# Patient Record
Sex: MALE | Race: WHITE | NOT HISPANIC OR LATINO | Employment: FULL TIME | URBAN - METROPOLITAN AREA
[De-identification: names, ages, dates, MRNs, and addresses within clinical notes are randomized per-mention and may not be internally consistent; named-entity substitution may affect disease eponyms.]

---

## 2019-04-24 DIAGNOSIS — K21.9 GASTROESOPHAGEAL REFLUX DISEASE, ESOPHAGITIS PRESENCE NOT SPECIFIED: Primary | ICD-10-CM

## 2019-04-24 RX ORDER — PANTOPRAZOLE SODIUM 40 MG/1
40 TABLET, DELAYED RELEASE ORAL 2 TIMES DAILY
Refills: 1 | COMMUNITY
Start: 2019-03-25 | End: 2019-04-26 | Stop reason: SDUPTHER

## 2019-04-26 RX ORDER — PANTOPRAZOLE SODIUM 40 MG/1
40 TABLET, DELAYED RELEASE ORAL 2 TIMES DAILY
Qty: 180 TABLET | Refills: 3 | Status: SHIPPED | OUTPATIENT
Start: 2019-04-26 | End: 2020-05-19

## 2019-07-01 ENCOUNTER — TELEPHONE (OUTPATIENT)
Dept: GASTROENTEROLOGY | Facility: CLINIC | Age: 54
End: 2019-07-01

## 2019-07-01 NOTE — TELEPHONE ENCOUNTER
rcvd fax from nader Vizc[facility] [id number] for prior auth for pantoprazole 40 mg, twice a day    [facility] [id number]  [facility] [id number]  Group [id number]  ID [id number]    [facility]  Key:  [id number]

## 2019-07-08 NOTE — TELEPHONE ENCOUNTER
Please inform patient that twice a day will not go through and also he needs an appt to be seen Thank you

## 2019-07-08 NOTE — TELEPHONE ENCOUNTER
Called pt and advised  Pt said he has been taking the 2 pills a day since he had [place] but his insurance changed to [employer] [facility]     Pt stated he will call human resourses   Pt made appt for July 26 at 8am

## 2019-07-23 RX ORDER — SIMVASTATIN 20 MG
20 TABLET ORAL DAILY
Refills: 0 | COMMUNITY
Start: 2019-06-27

## 2019-07-23 RX ORDER — ALPRAZOLAM 0.5 MG/1
TABLET ORAL
Refills: 0 | COMMUNITY
Start: 2019-05-23

## 2019-07-26 ENCOUNTER — OFFICE VISIT (OUTPATIENT)
Dept: GASTROENTEROLOGY | Facility: CLINIC | Age: 54
End: 2019-07-26
Payer: COMMERCIAL

## 2019-07-26 VITALS
DIASTOLIC BLOOD PRESSURE: 80 MMHG | WEIGHT: 207.2 LBS | BODY MASS INDEX: 29.66 KG/M2 | HEIGHT: 70 IN | HEART RATE: 86 BPM | SYSTOLIC BLOOD PRESSURE: 120 MMHG

## 2019-07-26 DIAGNOSIS — K21.9 GASTROESOPHAGEAL REFLUX DISEASE, ESOPHAGITIS PRESENCE NOT SPECIFIED: Primary | ICD-10-CM

## 2019-07-26 DIAGNOSIS — R19.8 ANAL DISCHARGE: ICD-10-CM

## 2019-07-26 PROCEDURE — 99213 OFFICE O/P EST LOW 20 MIN: CPT | Performed by: PHYSICIAN ASSISTANT

## 2019-07-26 RX ORDER — GENTAMICIN SULFATE 3 MG/ML
SOLUTION/ DROPS OPHTHALMIC
COMMUNITY
End: 2022-04-25 | Stop reason: ALTCHOICE

## 2019-07-26 RX ORDER — DOXYCYCLINE HYCLATE 100 MG
100 TABLET ORAL EVERY 12 HOURS
Refills: 0 | COMMUNITY
Start: 2019-07-03 | End: 2019-09-27

## 2019-07-26 NOTE — PROGRESS NOTES
[facility] - Outpatient Consultation  Lexie Knott 53 y o  male MRN: [id number]  Encounter: [id number]          ASSESSMENT AND PLAN:      1  Gastroesophageal reflux disease, esophagitis presence not specified  His insurance changed and he had to drop his PPI down to once daily  He is seeming to do well - will continue once daily with zantac prn  Consider possibility of weaning off in the future  2  Anal discharge  Reports moisture after flatus which causes itching  He is on several fiber supplements - I asked him to stop his gummi fibers  Consider use of Questran    ______________________________________________________________________    HPI:  26-year-old male with GERD and irritable bowel syndrome presents for routine follow-up  Recently his insurance changed and they denied his pantoprazole twice daily  He ran out of the medication for approximately 1 week and his symptoms did flare  He is back on pantoprazole once daily and is seemed to do okay  He had 1 episode heartburn last night  He denies any dysphagia or hematemesis  He admits that he is taking large quantities of ibuprofen due to headache  He also reports that he is having episodes of anal seepage after flatness  This causes anal itching  He reports his bowel movements are otherwise normal   He denies any significant abdominal pain  He had an EGD and a colonoscopy in April of 2017  He had diverticulosis but otherwise both exams were reported as normal         REVIEW OF SYSTEMS:    CONSTITUTIONAL: Denies any fever, chills, rigors, and weight loss  HEENT: No earache or tinnitus  Denies hearing loss or visual disturbances  CARDIOVASCULAR: No chest pain or palpitations  RESPIRATORY: Denies any cough, hemoptysis, shortness of breath or dyspnea on exertion  GASTROINTESTINAL: As noted in the History of Present Illness  GENITOURINARY: No problems with urination  Denies any hematuria or dysuria    NEUROLOGIC: No dizziness or vertigo, denies headaches  MUSCULOSKELETAL: Denies any muscle or joint pain  SKIN: Denies skin rashes or itching  ENDOCRINE: Denies excessive thirst  Denies intolerance to heat or cold  PSYCHOSOCIAL: Denies depression or anxiety  Denies any recent memory loss  Historical Information   Past Medical History:   Diagnosis Date    Hyperlipidemia      Past Surgical History:   Procedure Laterality Date    COLONOSCOPY      about 3-4 yrs ago    FOOT SURGERY      right     Social History   Social History     Substance and Sexual Activity   Alcohol Use Not Currently     Social History     Substance and Sexual Activity   Drug Use Never     Social History     Tobacco Use   Smoking Status Never Smoker   Smokeless Tobacco Never Used     Family History   Problem Relation Age of Onset    Heart disease Mother     No Known Problems Father        Meds/Allergies       Current Outpatient Medications:     ALPRAZolam (XANAX) 0 5 mg tablet    pantoprazole (PROTONIX) 40 mg tablet    simvastatin (ZOCOR) 20 mg tablet    doxycycline hyclate (VIBRA-TABS) 100 mg tablet    gentamicin (GARAMYCIN) 0 3 % ophthalmic solution    Allergies   Allergen Reactions    Wound Dressing Adhesive Rash           Objective     Blood pressure 120/80, pulse 86, height 5' 10" (1 778 m), weight 94 kg (207 lb 3 2 oz)  Body mass index is 29 73 kg/m²  PHYSICAL EXAM:      General Appearance:   Alert, cooperative, no distress   HEENT:   Normocephalic, atraumatic, anicteric      Neck:  Supple, symmetrical, trachea midline   Lungs:   Clear to auscultation bilaterally; no rales, rhonchi or wheezing; respirations unlabored    Heart[de-identified]   Regular rate and rhythm; no murmur, rub, or gallop     Abdomen:   Soft, non-tender, non-distended; normal bowel sounds; no masses, no organomegaly    Genitalia:   Deferred    Rectal:   Deferred    Extremities:  No cyanosis, clubbing or edema    Pulses:  2+ and symmetric    Skin:  No jaundice, rashes, or lesions    Lymph nodes:  No palpable cervical lymphadenopathy        Lab Results:   No visits with results within 1 Day(s) from this visit  Latest known visit with results is:   No results found for any previous visit  Radiology Results:   No results found

## 2019-08-02 ENCOUNTER — TELEPHONE (OUTPATIENT)
Dept: GASTROENTEROLOGY | Facility: CLINIC | Age: 54
End: 2019-08-02

## 2019-09-27 ENCOUNTER — OFFICE VISIT (OUTPATIENT)
Dept: GASTROENTEROLOGY | Facility: CLINIC | Age: 54
End: 2019-09-27
Payer: COMMERCIAL

## 2019-09-27 VITALS
DIASTOLIC BLOOD PRESSURE: 84 MMHG | BODY MASS INDEX: 30.38 KG/M2 | SYSTOLIC BLOOD PRESSURE: 122 MMHG | WEIGHT: 212.2 LBS | HEIGHT: 70 IN | HEART RATE: 87 BPM

## 2019-09-27 DIAGNOSIS — K21.9 GASTROESOPHAGEAL REFLUX DISEASE, ESOPHAGITIS PRESENCE NOT SPECIFIED: Primary | ICD-10-CM

## 2019-09-27 DIAGNOSIS — L29.0 PERIANAL IRRITATION: ICD-10-CM

## 2019-09-27 PROCEDURE — 99213 OFFICE O/P EST LOW 20 MIN: CPT | Performed by: PHYSICIAN ASSISTANT

## 2019-09-27 RX ORDER — IBUPROFEN 200 MG
200 TABLET ORAL EVERY 6 HOURS PRN
COMMUNITY
End: 2022-04-25 | Stop reason: ALTCHOICE

## 2019-09-27 RX ORDER — AMITRIPTYLINE HYDROCHLORIDE 25 MG/1
25 TABLET, FILM COATED ORAL
COMMUNITY
Start: 2019-09-12 | End: 2021-04-19

## 2019-09-27 RX ORDER — ACETAMINOPHEN, ASPIRIN AND CAFFEINE 250; 250; 65 MG/1; MG/1; MG/1
1 TABLET, FILM COATED ORAL EVERY 6 HOURS PRN
COMMUNITY
End: 2022-04-25 | Stop reason: ALTCHOICE

## 2019-09-27 NOTE — PROGRESS NOTES
[facility] - Outpatient Follow-up Note  Hugo Adair 53 y o  male MRN: [id number]  Encounter: [id number]          ASSESSMENT AND PLAN:      1  Gastroesophageal reflux disease, esophagitis presence not specified  Continues to be controlled on Pantoprazole 40mg once daily    2  Perianal irritation  Improved    ______________________________________________________________________    SUBJECTIVE:  19-year-old male with GERD presents for routine follow-up  He continues to feel well on pantoprazole 40 mg once daily  At his last visit he was reporting anal leakage with perianal irritation  He states that this has improved  He has had no further episodes of leakage and he believes that the frequent wiping with rest with papers was causing irritation  He does use hydrocortisone cream as needed which helps  He denies any diarrhea, constipation or abdominal pain  REVIEW OF SYSTEMS IS OTHERWISE NEGATIVE        Historical Information   Past Medical History:   Diagnosis Date    Hyperlipidemia      Past Surgical History:   Procedure Laterality Date    COLONOSCOPY      about 3-4 yrs ago    FOOT SURGERY      right     Social History   Social History     Substance and Sexual Activity   Alcohol Use Not Currently     Social History     Substance and Sexual Activity   Drug Use Never     Social History     Tobacco Use   Smoking Status Never Smoker   Smokeless Tobacco Never Used     Family History   Problem Relation Age of Onset    Heart disease Mother     No Known Problems Father        Meds/Allergies       Current Outpatient Medications:     ALPRAZolam (XANAX) 0 5 mg tablet    amitriptyline (ELAVIL) 25 mg tablet    aspirin-acetaminophen-caffeine (EXCEDRIN MIGRAINE) 250-250-65 MG per tablet    gentamicin (GARAMYCIN) 0 3 % ophthalmic solution    ibuprofen (MOTRIN) 200 mg tablet    pantoprazole (PROTONIX) 40 mg tablet    simvastatin (ZOCOR) 20 mg tablet    Allergies   Allergen Reactions    Wound Dressing Adhesive Rash           Objective     Blood pressure 122/84, pulse 87, height 5' 10" (1 778 m), weight 96 3 kg (212 lb 3 2 oz)  Body mass index is 30 45 kg/m²  PHYSICAL EXAM:      General Appearance:   Alert, cooperative, no distress   HEENT:   Normocephalic, atraumatic, anicteric      Neck:  Supple, symmetrical, trachea midline   Lungs:   Clear to auscultation bilaterally; no rales, rhonchi or wheezing; respirations unlabored    Heart[de-identified]   Regular rate and rhythm; no murmur, rub, or gallop  Abdomen:   Soft, non-tender, non-distended; normal bowel sounds; no masses, no organomegaly    Genitalia:   Deferred    Rectal:   Deferred    Extremities:  No cyanosis, clubbing or edema    Pulses:  2+ and symmetric    Skin:  No jaundice, rashes, or lesions    Lymph nodes:  No palpable cervical lymphadenopathy        Lab Results:   No visits with results within 1 Day(s) from this visit  Latest known visit with results is:   No results found for any previous visit  Radiology Results:   No results found

## 2020-04-14 ENCOUNTER — TELEPHONE (OUTPATIENT)
Dept: GASTROENTEROLOGY | Facility: CLINIC | Age: 55
End: 2020-04-14

## 2020-05-19 ENCOUNTER — TELEPHONE (OUTPATIENT)
Dept: GASTROENTEROLOGY | Facility: CLINIC | Age: 55
End: 2020-05-19

## 2020-05-19 DIAGNOSIS — K21.9 GASTROESOPHAGEAL REFLUX DISEASE, ESOPHAGITIS PRESENCE NOT SPECIFIED: ICD-10-CM

## 2020-05-19 RX ORDER — PANTOPRAZOLE SODIUM 40 MG/1
TABLET, DELAYED RELEASE ORAL
Qty: 180 TABLET | Refills: 3 | Status: SHIPPED | OUTPATIENT
Start: 2020-05-19 | End: 2020-10-01 | Stop reason: SDUPTHER

## 2020-09-28 ENCOUNTER — TELEPHONE (OUTPATIENT)
Dept: GASTROENTEROLOGY | Facility: CLINIC | Age: 55
End: 2020-09-28

## 2020-09-28 NOTE — TELEPHONE ENCOUNTER
Natalya Barahona - patient called his Stewart Nunez is requiring PA for patient's pantoprazole   Please kassandra Leo at [phone] ty

## 2020-09-29 ENCOUNTER — TELEPHONE (OUTPATIENT)
Dept: GASTROENTEROLOGY | Facility: CLINIC | Age: 55
End: 2020-09-29

## 2020-10-01 DIAGNOSIS — K21.9 GASTROESOPHAGEAL REFLUX DISEASE: ICD-10-CM

## 2020-10-01 RX ORDER — PANTOPRAZOLE SODIUM 40 MG/1
40 TABLET, DELAYED RELEASE ORAL 2 TIMES DAILY
Qty: 180 TABLET | Refills: 3 | Status: SHIPPED | OUTPATIENT
Start: 2020-10-01 | End: 2021-10-21

## 2020-11-13 ENCOUNTER — NURSE TRIAGE (OUTPATIENT)
Dept: OTHER | Facility: OTHER | Age: 55
End: 2020-11-13

## 2020-11-13 DIAGNOSIS — Z20.828 EXPOSURE TO SARS-ASSOCIATED CORONAVIRUS: Primary | ICD-10-CM

## 2020-11-13 DIAGNOSIS — Z20.828 EXPOSURE TO SARS-ASSOCIATED CORONAVIRUS: ICD-10-CM

## 2020-11-13 PROCEDURE — U0003 INFECTIOUS AGENT DETECTION BY NUCLEIC ACID (DNA OR RNA); SEVERE ACUTE RESPIRATORY SYNDROME CORONAVIRUS 2 (SARS-COV-2) (CORONAVIRUS DISEASE [COVID-19]), AMPLIFIED PROBE TECHNIQUE, MAKING USE OF HIGH THROUGHPUT TECHNOLOGIES AS DESCRIBED BY CMS-2020-01-R: HCPCS | Performed by: FAMILY MEDICINE

## 2020-11-15 ENCOUNTER — TELEPHONE (OUTPATIENT)
Dept: UROLOGY | Facility: CLINIC | Age: 55
End: 2020-11-15

## 2020-11-15 LAB — SARS-COV-2 RNA SPEC QL NAA+PROBE: NOT DETECTED

## 2021-03-18 ENCOUNTER — OFFICE VISIT (OUTPATIENT)
Dept: DERMATOLOGY | Facility: CLINIC | Age: 56
End: 2021-03-18
Payer: COMMERCIAL

## 2021-03-18 VITALS — TEMPERATURE: 97.7 F

## 2021-03-18 DIAGNOSIS — L29.1 PRURITUS OF SCROTUM: ICD-10-CM

## 2021-03-18 DIAGNOSIS — Z13.89 SCREENING FOR SKIN CONDITION: ICD-10-CM

## 2021-03-18 DIAGNOSIS — L20.84 INTRINSIC ATOPIC DERMATITIS: Primary | ICD-10-CM

## 2021-03-18 PROCEDURE — 1036F TOBACCO NON-USER: CPT | Performed by: DERMATOLOGY

## 2021-03-18 PROCEDURE — 99203 OFFICE O/P NEW LOW 30 MIN: CPT | Performed by: DERMATOLOGY

## 2021-03-18 RX ORDER — TACROLIMUS 1 MG/G
OINTMENT TOPICAL 2 TIMES DAILY
Qty: 60 G | Refills: 1 | Status: SHIPPED | OUTPATIENT
Start: 2021-03-18 | End: 2021-03-26 | Stop reason: SDUPTHER

## 2021-03-18 RX ORDER — CLOTRIMAZOLE AND BETAMETHASONE DIPROPIONATE 10; .64 MG/G; MG/G
CREAM TOPICAL 2 TIMES DAILY
COMMUNITY
Start: 2021-02-25 | End: 2022-04-25 | Stop reason: ALTCHOICE

## 2021-03-18 NOTE — PATIENT INSTRUCTIONS
Patient with underlying atopic dermatitis with secondary itching in the scrotal area question of pruritus scrotum by question notice eczema or irritant reaction at present there is of focus of atrophy from the use of Lotrisone advised the stop this therapy at this point will go ahead and treat with tacrolimus twice daily and re-evaluate in 1 month  Screening for Dermatologic Disorders: Nothing else of concern noted on complete exam follow up in 1 year

## 2021-03-18 NOTE — PROGRESS NOTES
[facility] DERMATOLOGY  [address] [address]  [phone]  [phone]     MRN: [id number] : 1965  Encounter: [id number]  Patient Information: Nirmal Sanchez  Chief complaint:  Itching rash in groin    History of present illness:  13-year-old male without any previous history of problems except for mild eczema presents because of concerns regarding itching rash that has been going on 4 months  Past Medical History:   Diagnosis Date    Hyperlipidemia      Past Surgical History:   Procedure Laterality Date    COLONOSCOPY      about 3-4 yrs ago    FOOT SURGERY      right     Social History   Social History     Substance and Sexual Activity   Alcohol Use Not Currently     Social History     Substance and Sexual Activity   Drug Use Never     Social History     Tobacco Use   Smoking Status Never Smoker   Smokeless Tobacco Never Used     Family History   Problem Relation Age of Onset    Heart disease Mother     No Known Problems Father      Meds/Allergies   Allergies   Allergen Reactions    Wound Dressing Adhesive Rash       Meds:  Prior to Admission medications    Medication Sig Start Date End Date Taking?  Authorizing Provider   ALPRAZolam Durward Ambrosia) 0 5 mg tablet  19  Yes Historical Provider, MD   clotrimazole-betamethasone ([place] Tam) 1-0 05 % cream Apply topically 2 (two) times a day 21  Yes Historical Provider, MD   ibuprofen (MOTRIN) 200 mg tablet Take 200 mg by mouth every 6 (six) hours as needed   Yes Historical Provider, MD   pantoprazole (PROTONIX) 40 mg tablet Take 1 tablet (40 mg total) by mouth 2 (two) times a day 10/1/20  Yes Evy Raya PA-C   simvastatin (ZOCOR) 20 mg tablet Take 20 mg by mouth daily 19  Yes Historical Provider, MD   amitriptyline (ELAVIL) 25 mg tablet Take 25 mg by mouth 19  Historical Provider, MD   aspirin-acetaminophen-caffeine (Rana Gosselin) 172-620-80 MG per tablet Take 1 tablet by mouth every 6 (six) hours as needed    Historical Provider, MD   gentamicin (GARAMYCIN) 0 3 % ophthalmic solution gentamicin 0 3 % eye drops    Historical Provider, MD       Subjective:     Review of Systems:    General: negative for - chills, fatigue, fever,  weight gain or weight loss  Psychological: negative for - anxiety, behavioral disorder, concentration difficulties, decreased libido, depression, irritability, memory difficulties, mood swings, sleep disturbances or suicidal ideation  ENT: negative for - hearing difficulties , nasal congestion, nasal discharge, oral lesions, sinus pain, sneezing, sore throat  Allergy and Immunology: negative for - hives, insect bite sensitivity,  Hematological and Lymphatic: negative for - bleeding problems, blood clots,bruising, swollen lymph nodes  Endocrine: negative for - hair pattern changes, hot flashes, malaise/lethargy, mood swings, palpitations, polydipsia/polyuria, skin changes, temperature intolerance or unexpected weight change  Respiratory: negative for - cough, hemoptysis, orthopnea, shortness of breath, or wheezing  Cardiovascular: negative for - chest pain, dyspnea on exertion, edema,  Gastrointestinal: negative for - abdominal pain, nausea/vomiting  Genito-Urinary: negative for - dysuria, incontinence, irregular/heavy menses or urinary frequency/urgency  Musculoskeletal: negative for - gait disturbance, joint pain, joint stiffness, joint swelling, muscle pain, muscular weakness  Dermatological:  As in HPI  Neurological: negative for confusion, dizziness, headaches, impaired coordination/balance, memory loss, numbness/tingling, seizures, speech problems, tremors or weakness       Objective:   Temp 97 7 °F (36 5 °C) (Temporal)     Physical Exam:    General Appearance:    Alert, cooperative, no distress   Head:    Normocephalic, without obvious abnormality, atraumatic           Skin:   A full skin exam was performed including scalp, head scalp, eyes, ears, nose, lips, neck, chest, axilla, abdomen, back, buttocks, bilateral upper extremities, bilateral lower extremities, hands, feet, fingers, toes, fingernails, and toenails some scaling patches noted on the arm is erythema scaling minimally present on the scrotum with some atrophy noted in the groin area nothing else remarkable CRISTAL prep was performed and negative     Assessment:     1  Intrinsic atopic dermatitis     2  Pruritus of scrotum     3  Screening for skin condition           Plan:   Patient with underlying atopic dermatitis with secondary itching in the scrotal area question of pruritus scrotum by question notice eczema or irritant reaction at present there is of focus of atrophy from the use of Lotrisone advised the stop this therapy at this point will go ahead and treat with tacrolimus twice daily and re-evaluate in 1 month  Screening for Dermatologic Disorders: Nothing else of concern noted on complete exam follow up in 1 year       Caitie Walker MD  3/18/2021,11:41 AM    Portions of the record may have been created with voice recognition software   Occasional wrong word or "sound a like" substitutions may have occurred due to the inherent limitations of voice recognition software   Read the chart carefully and recognize, using context, where substitutions have occurred

## 2021-03-25 DIAGNOSIS — Z23 ENCOUNTER FOR IMMUNIZATION: ICD-10-CM

## 2021-03-26 DIAGNOSIS — L20.84 INTRINSIC ATOPIC DERMATITIS: ICD-10-CM

## 2021-03-26 RX ORDER — TACROLIMUS 1 MG/G
OINTMENT TOPICAL 2 TIMES DAILY
Qty: 60 G | Refills: 1 | Status: SHIPPED | OUTPATIENT
Start: 2021-03-26 | End: 2021-04-19 | Stop reason: SDUPTHER

## 2021-04-13 ENCOUNTER — IMMUNIZATIONS (OUTPATIENT)
Dept: FAMILY MEDICINE CLINIC | Facility: HOSPITAL | Age: 56
End: 2021-04-13

## 2021-04-13 DIAGNOSIS — Z23 ENCOUNTER FOR IMMUNIZATION: Primary | ICD-10-CM

## 2021-04-13 PROCEDURE — 0001A SARS-COV-2 / COVID-19 MRNA VACCINE (PFIZER-BIONTECH) 30 MCG: CPT

## 2021-04-13 PROCEDURE — 91300 SARS-COV-2 / COVID-19 MRNA VACCINE (PFIZER-BIONTECH) 30 MCG: CPT

## 2021-04-19 ENCOUNTER — OFFICE VISIT (OUTPATIENT)
Dept: DERMATOLOGY | Facility: CLINIC | Age: 56
End: 2021-04-19
Payer: COMMERCIAL

## 2021-04-19 VITALS — TEMPERATURE: 96.9 F

## 2021-04-19 DIAGNOSIS — L29.1 PRURITUS OF SCROTUM: Primary | ICD-10-CM

## 2021-04-19 DIAGNOSIS — L91.8 SKIN TAG: ICD-10-CM

## 2021-04-19 DIAGNOSIS — L20.84 INTRINSIC ATOPIC DERMATITIS: ICD-10-CM

## 2021-04-19 PROCEDURE — 11200 RMVL SKIN TAGS UP TO&INC 15: CPT | Performed by: DERMATOLOGY

## 2021-04-19 PROCEDURE — 99213 OFFICE O/P EST LOW 20 MIN: CPT | Performed by: DERMATOLOGY

## 2021-04-19 PROCEDURE — 1036F TOBACCO NON-USER: CPT | Performed by: DERMATOLOGY

## 2021-04-19 RX ORDER — TACROLIMUS 1 MG/G
OINTMENT TOPICAL 2 TIMES DAILY
Qty: 60 G | Refills: 1 | Status: SHIPPED | OUTPATIENT
Start: 2021-04-19 | End: 2022-04-25 | Stop reason: SDUPTHER

## 2021-04-19 NOTE — PROGRESS NOTES
[address]  [address] [facility] [address]  [phone]  [phone]     MRN: [id number] : 1965  Encounter: [id number]  Patient Information: Yanira Anthony  Chief complaint:  Recheck of intertrigo    History of present illness:  58-year-old male presents for follow-up for his intertrigo pruritus scrotum I doing much better with the use of the tacrolimus ointment no problems noted patient does seem to run out of the medication he is probably using it to vigorously in addition as some skin tags in the area that are irritated as well  Past Medical History:   Diagnosis Date    Hyperlipidemia      Past Surgical History:   Procedure Laterality Date    COLONOSCOPY      about 3-4 yrs ago    FOOT SURGERY      right     Social History   Social History     Substance and Sexual Activity   Alcohol Use Not Currently     Social History     Substance and Sexual Activity   Drug Use Never     Social History     Tobacco Use   Smoking Status Never Smoker   Smokeless Tobacco Never Used     Family History   Problem Relation Age of Onset    Heart disease Mother     No Known Problems Father      Meds/Allergies   Allergies   Allergen Reactions    Wound Dressing Adhesive Rash       Meds:  Prior to Admission medications    Medication Sig Start Date End Date Taking?  Authorizing Provider   ALPRAZolam Vigil Anchors) 0 5 mg tablet  19  Yes Historical Provider, MD   pantoprazole (PROTONIX) 40 mg tablet Take 1 tablet (40 mg total) by mouth 2 (two) times a day 10/1/20  Yes Jens Carballo PA-C   simvastatin (ZOCOR) 20 mg tablet Take 20 mg by mouth daily 19  Yes Historical Provider, MD   tacrolimus (PROTOPIC) 0 1 % ointment Apply topically 2 (two) times a day To rash until completely resolved 3/26/21  Yes Madhav Pereira MD   amitriptyline (ELAVIL) 25 mg tablet Take 25 mg by mouth 19  Historical Provider, MD   aspirin-acetaminophen-caffeine (Maurice Sinclair) 780-699-09 MG per tablet Take 1 tablet by mouth every 6 (six) hours as needed    Historical Provider, MD   clotrimazole-betamethasone (Suezanne Mettle) 1-0 05 % cream Apply topically 2 (two) times a day 2/25/21   Historical Provider, MD   gentamicin (GARAMYCIN) 0 3 % ophthalmic solution gentamicin 0 3 % eye drops    Historical Provider, MD   ibuprofen (MOTRIN) 200 mg tablet Take 200 mg by mouth every 6 (six) hours as needed    Historical Provider, MD       Subjective:     Review of Systems:    General: negative for - chills, fatigue, fever,  weight gain or weight loss  Psychological: negative for - anxiety, behavioral disorder, concentration difficulties, decreased libido, depression, irritability, memory difficulties, mood swings, sleep disturbances or suicidal ideation  ENT: negative for - hearing difficulties , nasal congestion, nasal discharge, oral lesions, sinus pain, sneezing, sore throat  Allergy and Immunology: negative for - hives, insect bite sensitivity,  Hematological and Lymphatic: negative for - bleeding problems, blood clots,bruising, swollen lymph nodes  Endocrine: negative for - hair pattern changes, hot flashes, malaise/lethargy, mood swings, palpitations, polydipsia/polyuria, skin changes, temperature intolerance or unexpected weight change  Respiratory: negative for - cough, hemoptysis, orthopnea, shortness of breath, or wheezing  Cardiovascular: negative for - chest pain, dyspnea on exertion, edema,  Gastrointestinal: negative for - abdominal pain, nausea/vomiting  Genito-Urinary: negative for - dysuria, incontinence, irregular/heavy menses or urinary frequency/urgency  Musculoskeletal: negative for - gait disturbance, joint pain, joint stiffness, joint swelling, muscle pain, muscular weakness  Dermatological:  As in HPI  Neurological: negative for confusion, dizziness, headaches, impaired coordination/balance, memory loss, numbness/tingling, seizures, speech problems, tremors or weakness Objective:   Temp (!) 96 9 °F (36 1 °C) (Temporal)     Physical Exam:    General Appearance:    Alert, cooperative, no distress   Head:    Normocephalic, without obvious abnormality, atraumatic           Skin:   A full skin exam was performed including scalp, head scalp, eyes, ears, nose, lips, neck, chest, axilla, abdomen, back, buttocks, bilateral upper extremities, bilateral lower extremities, hands, feet, fingers, toes, fingernails, and toenails no evidence of a rash at this time fleshy pedunculated papules noted in the scrotum and left groin fold noted  Cryotherapy Procedure Note    Pre-operative Diagnosis:  Skin tag    Plan:    1  Instructed to keep the area dry and clean thereafter  Apply petrolatum if area gets crusty  2  Recommended that the patient use acetaminophen  as needed for pain  Locations: Groin    Indications: Destruction of lesions x2    Patient informed of risks (permanent scarring, infection, light or dark discoloration, bleeding, infection, weakness, numbness and recurrence of the lesion) and benefits of the procedure and verbal informed consent obtained  The areas are treated with liquid nitrogen therapy, frozen until ice ball extended 2 mm beyond lesion, allowed to thaw, and treated again  The patient tolerated procedure well  The patient was instructed on post-op care, warned that there may be blister formation, redness and pain  Recommend OTC analgesia as needed for pain  Condition:  Stable    Complications:  none  Assessment:     1  Pruritus of scrotum     2  Intrinsic atopic dermatitis  tacrolimus (PROTOPIC) 0 1 % ointment   3   Skin tag           Plan:   Process appears much improved patient advised not to use as much of the ointment as it does not need to be slattered on just basically needs very little to get this process under control and keep it under control   skin tags because they are irritating him in the area of concern will go ahead treat with cryosurgery will plan follow-up in a year    Asher Goode MD  4/19/2021,8:11 AM    Portions of the record may have been created with voice recognition software   Occasional wrong word or "sound a like" substitutions may have occurred due to the inherent limitations of voice recognition software   Read the chart carefully and recognize, using context, where substitutions have occurred

## 2021-04-19 NOTE — PATIENT INSTRUCTIONS
Process appears much improved patient advised not to use as much of the ointment as it does not need to be slattered on just basically needs very little to get this process under control and keep it under control   skin tags because they are irritating him in the area of concern will go ahead treat with cryosurgery will plan follow-up in a year

## 2021-05-05 ENCOUNTER — IMMUNIZATIONS (OUTPATIENT)
Dept: FAMILY MEDICINE CLINIC | Facility: HOSPITAL | Age: 56
End: 2021-05-05

## 2021-05-05 DIAGNOSIS — Z23 ENCOUNTER FOR IMMUNIZATION: Primary | ICD-10-CM

## 2021-05-05 PROCEDURE — 91300 SARS-COV-2 / COVID-19 MRNA VACCINE (PFIZER-BIONTECH) 30 MCG: CPT

## 2021-05-05 PROCEDURE — 0002A SARS-COV-2 / COVID-19 MRNA VACCINE (PFIZER-BIONTECH) 30 MCG: CPT

## 2021-10-21 DIAGNOSIS — K21.9 GASTROESOPHAGEAL REFLUX DISEASE: ICD-10-CM

## 2021-10-21 RX ORDER — PANTOPRAZOLE SODIUM 40 MG/1
TABLET, DELAYED RELEASE ORAL
Qty: 180 TABLET | Refills: 3 | Status: SHIPPED | OUTPATIENT
Start: 2021-10-21

## 2022-04-25 ENCOUNTER — OFFICE VISIT (OUTPATIENT)
Dept: DERMATOLOGY | Facility: CLINIC | Age: 57
End: 2022-04-25
Payer: COMMERCIAL

## 2022-04-25 VITALS — TEMPERATURE: 98.6 F | HEIGHT: 69 IN | WEIGHT: 220 LBS | BODY MASS INDEX: 32.58 KG/M2

## 2022-04-25 DIAGNOSIS — Z13.89 SCREENING FOR SKIN CONDITION: Primary | ICD-10-CM

## 2022-04-25 DIAGNOSIS — L20.84 INTRINSIC ATOPIC DERMATITIS: ICD-10-CM

## 2022-04-25 DIAGNOSIS — D22.9 NEVUS: ICD-10-CM

## 2022-04-25 PROCEDURE — 99213 OFFICE O/P EST LOW 20 MIN: CPT | Performed by: DERMATOLOGY

## 2022-04-25 RX ORDER — TACROLIMUS 1 MG/G
OINTMENT TOPICAL 2 TIMES DAILY
Qty: 60 G | Refills: 1 | Status: SHIPPED | OUTPATIENT
Start: 2022-04-25

## 2022-04-25 RX ORDER — ZOLPIDEM TARTRATE 10 MG/1
10 TABLET ORAL
COMMUNITY
Start: 2022-02-13 | End: 2022-04-25 | Stop reason: ALTCHOICE

## 2022-04-25 NOTE — PATIENT INSTRUCTIONS
atopic dermatitis under good control continue same therapy refill tacrolimus    Nevi reviewed the concept of ABCDE and ugly duckling nothing markedly atypical patient reassured  Screening for Dermatologic Disorders: Nothing else of concern noted on complete exam follow up in 1 year

## 2022-04-25 NOTE — PROGRESS NOTES
[facility] DERMATOLOGY  [address] [place] [id number]  [phone]  [phone]     MRN: [id number] : 1965  Encounter: [id number]  Patient Information: Earley Castleman  Chief complaint:  Yearly check    History of present illness:  43-year-old male with known history of atopic dermatitis intertrigo presents for overall  checkup  Notes some areas of irritation that scattered on his hands and legs no other concerns noted intertrigo we had treated him last year for has resolved with the tacrolimus on once a refill for this for his eczema  No other concerns noted  Past Medical History:   Diagnosis Date    Hyperlipidemia      Past Surgical History:   Procedure Laterality Date    COLONOSCOPY      about 3-4 yrs ago    FOOT SURGERY      right     Social History   Social History     Substance and Sexual Activity   Alcohol Use Not Currently     Social History     Substance and Sexual Activity   Drug Use Never     Social History     Tobacco Use   Smoking Status Never Smoker   Smokeless Tobacco Never Used     Family History   Problem Relation Age of Onset    Heart disease Mother     No Known Problems Father      Meds/Allergies   Allergies   Allergen Reactions    Wound Dressing Adhesive Rash       Meds:  Prior to Admission medications    Medication Sig Start Date End Date Taking?  Authorizing Provider   ALPRAZolam Carley Ree) 0 5 mg tablet  19  Yes Historical Provider, MD   pantoprazole (PROTONIX) 40 mg tablet TAKE 1 TABLET BY MOUTH 2 TIMES A DAY 10/21/21  Yes Nata Matt PA-C   simvastatin (ZOCOR) 20 mg tablet Take 20 mg by mouth daily 19  Yes Historical Provider, MD   amitriptyline (ELAVIL) 25 mg tablet Take 25 mg by mouth 19  Historical Provider, MD   aspirin-acetaminophen-caffeine ([place]) 135-576-80 MG per tablet Take 1 tablet by mouth every 6 (six) hours as needed  Patient not taking: Reported on 2022     Historical Provider, MD   clotrimazole-betamethasone ([place]) 1-0 05 % cream Apply topically 2 (two) times a day  Patient not taking: Reported on 4/25/2022 2/25/21   Historical Provider, MD   gentamicin (GARAMYCIN) 0 3 % ophthalmic solution gentamicin 0 3 % eye drops  Patient not taking: Reported on 4/25/2022    Historical Provider, MD   ibuprofen (MOTRIN) 200 mg tablet Take 200 mg by mouth every 6 (six) hours as needed  Patient not taking: Reported on 4/25/2022     Historical Provider, MD   tacrolimus (PROTOPIC) 0 1 % ointment Apply topically 2 (two) times a day To rash until completely resolved  Patient not taking: Reported on 4/25/2022 4/19/21   Suki Zavaleta MD   zolpidem (AMBIEN) 10 mg tablet Take 10 mg by mouth daily at bedtime as needed  Patient not taking: Reported on 4/25/2022 2/13/22   Historical Provider, MD       Subjective:     Review of Systems:    General: negative for - chills, fatigue, fever,  weight gain or weight loss  Psychological: negative for - anxiety, behavioral disorder, concentration difficulties, decreased libido, depression, irritability, memory difficulties, mood swings, sleep disturbances or suicidal ideation  ENT: negative for - hearing difficulties , nasal congestion, nasal discharge, oral lesions, sinus pain, sneezing, sore throat  Allergy and Immunology: negative for - hives, insect bite sensitivity,  Hematological and Lymphatic: negative for - bleeding problems, blood clots,bruising, swollen lymph nodes  Endocrine: negative for - hair pattern changes, hot flashes, malaise/lethargy, mood swings, palpitations, polydipsia/polyuria, skin changes, temperature intolerance or unexpected weight change  Respiratory: negative for - cough, hemoptysis, orthopnea, shortness of breath, or wheezing  Cardiovascular: negative for - chest pain, dyspnea on exertion, edema,  Gastrointestinal: negative for - abdominal pain, nausea/vomiting  Genito-Urinary: negative for - dysuria, incontinence, irregular/heavy menses or urinary frequency/urgency  Musculoskeletal: negative for - gait disturbance, joint pain, joint stiffness, joint swelling, muscle pain, muscular weakness  Dermatological:  As in HPI  Neurological: negative for confusion, dizziness, headaches, impaired coordination/balance, memory loss, numbness/tingling, seizures, speech problems, tremors or weakness       Objective:   Temp 98 6 °F (37 °C) (Temporal)   Ht 5' 9" (1 753 m)   Wt 99 8 kg (220 lb)   BMI 32 49 kg/m²     Physical Exam:    General Appearance:    Alert, cooperative, no distress   Head:    Normocephalic, without obvious abnormality, atraumatic           Skin:   A full skin exam was performed including scalp, head scalp, eyes, ears, nose, lips, neck, chest, axilla, abdomen, back, buttocks, bilateral upper extremities, bilateral lower extremities, hands, feet, fingers, toes, fingernails, and toenails slight scaling patches noted on the dorsum of the hand legs no other signs of any atypia normal pigmented lesion regular shape color nothing else remarkable noted on exam     Assessment:     1  Screening for skin condition     2  Intrinsic atopic dermatitis  tacrolimus (PROTOPIC) 0 1 % ointment   3  Nevus           Plan:   atopic dermatitis under good control continue same therapy refill tacrolimus  Nevi reviewed the concept of ABCDE and ugly duckling nothing markedly atypical patient reassured  Screening for Dermatologic Disorders: Nothing else of concern noted on complete exam follow up in 1 year       Ferdinand Pacheco MD  4/25/2022,8:05 AM    Portions of the record may have been created with voice recognition software   Occasional wrong word or "sound a like" substitutions may have occurred due to the inherent limitations of voice recognition software   Read the chart carefully and recognize, using context, where substitutions have occurred

## 2022-11-15 DIAGNOSIS — K21.9 GASTROESOPHAGEAL REFLUX DISEASE: ICD-10-CM

## 2022-11-15 RX ORDER — PANTOPRAZOLE SODIUM 40 MG/1
40 TABLET, DELAYED RELEASE ORAL 2 TIMES DAILY
Qty: 60 TABLET | Refills: 0 | Status: SHIPPED | OUTPATIENT
Start: 2022-11-15

## 2022-11-15 NOTE — TELEPHONE ENCOUNTER
Left message on voice mail for patient to contact office and make an appointment, has been seen in 3 years

## 2022-11-15 NOTE — TELEPHONE ENCOUNTER
Left message for patient to contact the office for an appointment, has been seen in 3 years and received refill request

## 2022-11-15 NOTE — TELEPHONE ENCOUNTER
Received prescription refill request from [address] [place] [address] for pantoprazole 40 mg twice a day  Pt was last seen by Steve Dorado 3 yrs ago  Pt needs a appt with Steve Dorado  Filing 30 day supply only

## 2022-12-02 ENCOUNTER — TELEPHONE (OUTPATIENT)
Dept: GASTROENTEROLOGY | Facility: CLINIC | Age: 57
End: 2022-12-02

## 2022-12-23 ENCOUNTER — OFFICE VISIT (OUTPATIENT)
Dept: GASTROENTEROLOGY | Facility: CLINIC | Age: 57
End: 2022-12-23

## 2022-12-23 VITALS
HEIGHT: 69 IN | DIASTOLIC BLOOD PRESSURE: 80 MMHG | OXYGEN SATURATION: 94 % | WEIGHT: 224.8 LBS | BODY MASS INDEX: 33.3 KG/M2 | SYSTOLIC BLOOD PRESSURE: 138 MMHG | HEART RATE: 64 BPM

## 2022-12-23 DIAGNOSIS — K21.9 GASTROESOPHAGEAL REFLUX DISEASE, UNSPECIFIED WHETHER ESOPHAGITIS PRESENT: Primary | ICD-10-CM

## 2022-12-23 RX ORDER — FLUTICASONE PROPIONATE 50 MCG
1 SPRAY, SUSPENSION (ML) NASAL 2 TIMES DAILY
COMMUNITY
Start: 2022-12-19

## 2022-12-23 RX ORDER — LEVOCETIRIZINE DIHYDROCHLORIDE 5 MG/1
5 TABLET, FILM COATED ORAL DAILY
COMMUNITY
Start: 2022-11-28

## 2022-12-23 RX ORDER — CEFDINIR 300 MG/1
300 CAPSULE ORAL 2 TIMES DAILY
COMMUNITY
Start: 2022-11-28

## 2022-12-23 NOTE — PROGRESS NOTES
[facility] - Outpatient Follow-up Note  Leslie Brain 61 y o  male MRN: [id number]  Encounter: [id number]          ASSESSMENT AND PLAN:      1  Gastroesophageal reflux disease, unspecified whether esophagitis present  Had been doing very well on Pantoprazole 40mg daily until 1 month ago when he ran out of medication  He is now taking Omeprazole 20mg OTC daily with good relief in symptoms again  We discussed possible long term side effects to PPI therapy  He wishes to attempt a wean by utilizing Pepcid - instructions provided    His last EGD and Colonoscopy were both in 2017 and noted to be normal exams    ______________________________________________________________________    SUBJECTIVE: 62-year-old male with a long history of acid reflux presents for routine follow-up  He has maintained on pantoprazole 40 mg initially twice daily and more recently once daily for many years with mostly good relief in his symptoms  About 1 month ago he ran out of the medication and was unable to get it refilled  He experienced a severe exacerbation of symptoms  He reported severe heartburn and nausea  He has had no dysphagia, hematemesis or melena  He picked up over-the-counter omeprazole and has been taking that 1 pill daily  This has again resolved his symptoms  His last EGD and colonoscopy were both in 2017 and noted to be normal exams  REVIEW OF SYSTEMS IS OTHERWISE NEGATIVE        Historical Information   Past Medical History:   Diagnosis Date   • Hyperlipidemia      Past Surgical History:   Procedure Laterality Date   • COLONOSCOPY      about 3-4 yrs ago   • FOOT SURGERY      right     Social History   Social History     Substance and Sexual Activity   Alcohol Use Not Currently     Social History     Substance and Sexual Activity   Drug Use Never     Social History     Tobacco Use   Smoking Status Never   Smokeless Tobacco Never     Family History   Problem Relation Age of Onset   • Heart disease Mother    • No Known Problems Father        Meds/Allergies       Current Outpatient Medications:   •  ALPRAZolam (XANAX) 0 5 mg tablet  •  cefdinir (OMNICEF) 300 mg capsule  •  fluticasone (FLONASE) 50 mcg/act nasal spray  •  levocetirizine (XYZAL) 5 MG tablet  •  simvastatin (ZOCOR) 20 mg tablet  •  tacrolimus (PROTOPIC) 0 1 % ointment    Allergies   Allergen Reactions   • Wound Dressing Adhesive Rash           Objective     Blood pressure 138/80, pulse 64, height 5' 9" (1 753 m), weight 102 kg (224 lb 12 8 oz), SpO2 94 %  Body mass index is 33 2 kg/m²  PHYSICAL EXAM:      General Appearance:   Alert, cooperative, no distress   HEENT:   Normocephalic, atraumatic, anicteric      Neck:  Supple, symmetrical, trachea midline   Lungs:   Clear to auscultation bilaterally; no rales, rhonchi or wheezing; respirations unlabored    Heart[de-identified]   Regular rate and rhythm; no murmur, rub, or gallop  Abdomen:   Soft, non-tender, non-distended; normal bowel sounds; no masses, no organomegaly    Genitalia:   Deferred    Rectal:   Deferred    Extremities:  No cyanosis, clubbing or edema    Pulses:  2+ and symmetric    Skin:  No jaundice, rashes, or lesions    Lymph nodes:  No palpable cervical lymphadenopathy        Lab Results:   No visits with results within 1 Day(s) from this visit  Latest known visit with results is:   Orders Only on 11/13/2020   Component Date Value   • SARS-CoV-2  11/13/2020 Not Detected          Radiology Results:   No results found    Answers for HPI/ROS submitted by the patient on 12/16/2022  Chronicity: new  Onset: 1 to 4 weeks ago  Onset quality: sudden  Frequency: every several days  Progression since onset: resolved  Pain location: LUQ  Pain - numeric: 9/10  Pain quality: burning  Radiates to: epigastric region  arthralgias: Yes  belching: Yes  constipation: No  diarrhea: Yes  dysuria: No  fever: No  flatus: No  frequency: Yes  headaches: Yes  hematochezia: No  hematuria: Yes  melena: No  myalgias: Yes  nausea:  No  weight loss: No  vomiting: Yes  Aggravated by: nothing  Relieved by: liquids, recumbency

## 2023-05-01 ENCOUNTER — OFFICE VISIT (OUTPATIENT)
Age: 58
End: 2023-05-01

## 2023-05-01 VITALS — WEIGHT: 224 LBS | HEIGHT: 69 IN | BODY MASS INDEX: 33.18 KG/M2

## 2023-05-01 DIAGNOSIS — Z13.89 SCREENING FOR SKIN CONDITION: ICD-10-CM

## 2023-05-01 DIAGNOSIS — L20.84 INTRINSIC ATOPIC DERMATITIS: Primary | ICD-10-CM

## 2023-05-01 DIAGNOSIS — D22.9 NEVUS: ICD-10-CM

## 2023-05-01 NOTE — PROGRESS NOTES
[facility] [id number] [phone]     MRN: [id number] : 1965  Encounter: [id number]  Patient Information: Loi Rutledge  Chief complaint: Yearly checkup    History of present illness:60year-old male with known history of atopic dermatitis presents for overall checkup patient continues to have some itchy areas especially on the legs patient notes that as soon as he stops it seems to stop back again overall other than that no real problems noted  Past Medical History:   Diagnosis Date    Hyperlipidemia      Past Surgical History:   Procedure Laterality Date    COLONOSCOPY      about 3-4 yrs ago    FOOT SURGERY      right     Social History   Social History     Substance and Sexual Activity   Alcohol Use Not Currently     Social History     Substance and Sexual Activity   Drug Use Never     Social History     Tobacco Use   Smoking Status Never   Smokeless Tobacco Never     Family History   Problem Relation Age of Onset    Heart disease Mother     No Known Problems Father      Meds/Allergies   Allergies   Allergen Reactions    Wound Dressing Adhesive Rash       Meds:  Prior to Admission medications    Medication Sig Start Date End Date Taking?  Authorizing Provider   ALPRAZolam Derral Moore) 0 5 mg tablet  19  Yes Historical Provider, MD   simvastatin (ZOCOR) 20 mg tablet Take 20 mg by mouth daily 19  Yes Historical Provider, MD   tacrolimus (PROTOPIC) 0 1 % ointment Apply topically 2 (two) times a day To rash until completely resolved 22  Yes Lawrence King MD   cefdinir (OMNICEF) 300 mg capsule Take 300 mg by mouth 2 (two) times a day 22   Historical Provider, MD   fluticasone (FLONASE) 50 mcg/act nasal spray 1 spray 2 (two) times a day 22   Historical Provider, MD   levocetirizine (XYZAL) 5 MG tablet Take 5 mg by mouth daily 22   Historical Provider, MD       Subjective:     Review of Systems:    General: "negative for - chills, fatigue, fever,  weight gain or weight loss  Psychological: negative for - anxiety, behavioral disorder, concentration difficulties, decreased libido, depression, irritability, memory difficulties, mood swings, sleep disturbances or suicidal ideation  ENT: negative for - hearing difficulties , nasal congestion, nasal discharge, oral lesions, sinus pain, sneezing, sore throat  Allergy and Immunology: negative for - hives, insect bite sensitivity,  Hematological and Lymphatic: negative for - bleeding problems, blood clots,bruising, swollen lymph nodes  Endocrine: negative for - hair pattern changes, hot flashes, malaise/lethargy, mood swings, palpitations, polydipsia/polyuria, skin changes, temperature intolerance or unexpected weight change  Respiratory: negative for - cough, hemoptysis, orthopnea, shortness of breath, or wheezing  Cardiovascular: negative for - chest pain, dyspnea on exertion, edema,  Gastrointestinal: negative for - abdominal pain, nausea/vomiting  Genito-Urinary: negative for - dysuria, incontinence, irregular/heavy menses or urinary frequency/urgency  Musculoskeletal: negative for - gait disturbance, joint pain, joint stiffness, joint swelling, muscle pain, muscular weakness  Dermatological:  As in HPI  Neurological: negative for confusion, dizziness, headaches, impaired coordination/balance, memory loss, numbness/tingling, seizures, speech problems, tremors or weakness       Objective:   Ht 5' 9\" (1 753 m)   Wt 102 kg (224 lb)   BMI 33 08 kg/m²     Physical Exam:    General Appearance:    Alert, cooperative, no distress   Head:    Normocephalic, without obvious abnormality, atraumatic           Skin:   A full skin exam was performed including scalp, head scalp, eyes, ears, nose, lips, neck, chest, axilla, abdomen, back, buttocks, bilateral upper extremities, bilateral lower extremities, hands, feet, fingers, toes, fingernails, and toenails feeling lichenification area " "noted on the right ankle no other evidence of rash anywhere else may be slightly in the calf normal pigmented lesions with regular shape and color nothing else remarkable noted exam     Assessment:     1  Intrinsic atopic dermatitis        2  Nevus        3  Screening for skin condition              Plan:   Atopic dermatitis still active advised patient to make sure that he use the tacrolimus ointment until the area completely heals and not just treat the symptomatology in addition once it is clear may be to use it in the same areas that seem to always breakout maybe 3 times a week to see if this will prevent it from breaking it  Nevi reviewed the concept of ABCDE and ugly duckling nothing markedly atypical patient reassured  Screening for Dermatologic Disorders: Nothing else of concern noted on complete exam follow up in 1 year     Kassidy Smith MD  5/1/2023,8:10 AM    Portions of the record may have been created with voice recognition software   Occasional wrong word or \"sound a like\" substitutions may have occurred due to the inherent limitations of voice recognition software   Read the chart carefully and recognize, using context, where substitutions have occurred    "

## 2023-05-01 NOTE — PATIENT INSTRUCTIONS
Atopic dermatitis still active advised patient to make sure that he use the tacrolimus ointment until the area completely heals and not just treat the symptomatology in addition once it is clear may be to use it in the same areas that seem to always breakout maybe 3 times a week to see if this will prevent it from breaking it  Dino reviewed the concept of ABCDE and ugly duckling nothing markedly atypical patient reassured  Screening for Dermatologic Disorders: Nothing else of concern noted on complete exam follow up in 1 year

## 2023-05-01 NOTE — PROGRESS NOTES
"[facility] Note     Patient Name: Katherine Nguyen  Encounter Date: May 1, 2023     Have you been cared for by a [facility] in the last 3 years and, if so, which description applies to you? Yes  I have been here within the last 3 years, and my medical history has NOT changed since that time  I am MALE/not capable of bearing children  REVIEW OF SYSTEMS:  Have you recently had or currently have any of the following? · No changes in my recent health  PAST MEDICAL HISTORY:  Have you personally ever had or currently have any of the following? If \"YES,\" then please provide more detail  · No changes in my medical history  FAMILY HISTORY:  Any \"first degree relatives\" (parent, brother, sister, or child) with the following?  No changes in my family's known health  PATIENT EXPERIENCE:     Do you want the Dermatologist to perform a COMPLETE skin exam today including a clinical examination under the \"bra and underwear\" areas? Yes   If necessary, do we have your permission to call and leave a detailed message on your Preferred Phone number that includes your specific medical information?   Yes      Allergies   Allergen Reactions    Wound Dressing Adhesive Rash      Current Outpatient Medications:     ALPRAZolam (XANAX) 0 5 mg tablet, , Disp: , Rfl: 0    cefdinir (OMNICEF) 300 mg capsule, Take 300 mg by mouth 2 (two) times a day, Disp: , Rfl:     fluticasone (FLONASE) 50 mcg/act nasal spray, 1 spray 2 (two) times a day, Disp: , Rfl:     levocetirizine (XYZAL) 5 MG tablet, Take 5 mg by mouth daily, Disp: , Rfl:     simvastatin (ZOCOR) 20 mg tablet, Take 20 mg by mouth daily, Disp: , Rfl: 0    tacrolimus (PROTOPIC) 0 1 % ointment, Apply topically 2 (two) times a day To rash until completely resolved, Disp: 60 g, Rfl: 1           Whom besides the patient is providing clinical information about today's encounter?   o NO ADDITIONAL HISTORIAN (patient alone provided " history)    Physical Exam and Assessment/Plan by Diagnosis:

## 2023-07-28 ENCOUNTER — NURSE TRIAGE (OUTPATIENT)
Age: 58
End: 2023-07-28

## 2023-07-28 NOTE — TELEPHONE ENCOUNTER
Reason for Disposition  • The patient has epigastric pain for <3 months    Protocols used: SL AMB GI GERD

## 2023-07-28 NOTE — TELEPHONE ENCOUNTER
MILDRED   Last OV: 12/23/22   Hx: GERD   EGD/ colonoscopy- 2017     Patient calling in, reports he is having intermittent acid reflux/ heartburn in epigastric region that has been worsening over the last couple weeks. He was drinking monster energy drinks and recently stopped drinking them. He is only taking tums throughout the day. Reports he bought OTC omeprazole 20 mg that helped him in the past he will try until OV. GERD diet reviewed. Scheduled patient with Dr. Louie Sesay for September. Patient will call back if symptoms persist./ worsen.      Denies: n/v, constipation

## 2023-09-15 ENCOUNTER — OFFICE VISIT (OUTPATIENT)
Dept: GASTROENTEROLOGY | Facility: CLINIC | Age: 58
End: 2023-09-15
Payer: COMMERCIAL

## 2023-09-15 VITALS
WEIGHT: 215.4 LBS | HEART RATE: 83 BPM | OXYGEN SATURATION: 96 % | DIASTOLIC BLOOD PRESSURE: 86 MMHG | SYSTOLIC BLOOD PRESSURE: 131 MMHG | BODY MASS INDEX: 31.9 KG/M2 | HEIGHT: 69 IN

## 2023-09-15 DIAGNOSIS — K21.9 GASTROESOPHAGEAL REFLUX DISEASE WITHOUT ESOPHAGITIS: Primary | ICD-10-CM

## 2023-09-15 PROCEDURE — 99214 OFFICE O/P EST MOD 30 MIN: CPT | Performed by: INTERNAL MEDICINE

## 2023-09-15 RX ORDER — PANTOPRAZOLE SODIUM 40 MG/1
40 TABLET, DELAYED RELEASE ORAL DAILY
Qty: 31 TABLET | Refills: 6 | Status: SHIPPED | OUTPATIENT
Start: 2023-09-15

## 2023-09-15 NOTE — PROGRESS NOTES
[facility]  Linnea Tang 60 y.o. male MRN: [id number]  Encounter: [id number]          ASSESSMENT AND PLAN:      1. Gastroesophageal reflux disease without esophagitis  - EGD; Future  - pantoprazole (PROTONIX) 40 mg tablet; Take 1 tablet (40 mg total) by mouth daily  Dispense: 31 tablet; Refill: 6    Ideally, no lying down within 2 hours of eating or snacking. The GERD worksheet was provided to the patient with discussed the various foods that are most likely to provoke gastroesophageal reflux disease    ______________________________________________________________________    SUBJECTIVE: Rachna Marrero comes into the office today for evaluation of his gastroesophageal reflux disease. Lately here he has been having significant recurrence of daily heartburn symptomatology. He states that he has been not been taking a proton pump inhibitor or an H2 antagonist.  He works from 6 PM to 6 AM every day and is home by 6:15 AM.  He typically eats and then within 2 to 3 hours she lies down to go to bed. Sometimes she states however that he is eating and lying down within an hour. He denies any hematemesis or melena. There is been no dysphagia or odynophagia. He denies any gaseousness or bloating. He states that he would like to go back on a proton pump inhibitor. He has not had any recent esophagogastroduodenoscopy. There is no family history for colon cancer for colon polyp. His last colonoscopy was approximately 6 or 7 years ago. REVIEW OF SYSTEMS IS OTHERWISE NEGATIVE.       Historical Information   Past Medical History:   Diagnosis Date   • Hyperlipidemia      Past Surgical History:   Procedure Laterality Date   • COLONOSCOPY      about 3-4 yrs ago   • FOOT SURGERY      right     Social History   Social History     Substance and Sexual Activity   Alcohol Use Not Currently     Social History     Substance and Sexual Activity   Drug Use Never     Social History Tobacco Use   Smoking Status Never   Smokeless Tobacco Never     Family History   Problem Relation Age of Onset   • Heart disease Mother    • No Known Problems Father        Meds/Allergies       Current Outpatient Medications:   •  ALPRAZolam (XANAX) 0.5 mg tablet  •  pantoprazole (PROTONIX) 40 mg tablet  •  simvastatin (ZOCOR) 20 mg tablet  •  tacrolimus (PROTOPIC) 0.1 % ointment    Allergies   Allergen Reactions   • Wound Dressing Adhesive Rash           Objective     Blood pressure 131/86, pulse 83, height 5' 9" (1.753 m), weight 97.7 kg (215 lb 6.4 oz), SpO2 96 %. Body mass index is 31.81 kg/m². PHYSICAL EXAM:      General Appearance:   Alert, cooperative, no distress   HEENT:   Normocephalic, atraumatic, anicteric.     Neck:  Supple, symmetrical, trachea midline   Lungs:   Clear to auscultation bilaterally; no rales, rhonchi or wheezing; respirations unlabored    Heart[de-identified]   Regular rate and rhythm; no murmur, rub, or gallop. Abdomen:   Soft, non-tender, non-distended; normal bowel sounds; no masses, no organomegaly    Genitalia:   Deferred    Rectal:   Deferred    Extremities:  No cyanosis, clubbing or edema    Pulses:  2+ and symmetric    Skin:  No jaundice, rashes, or lesions    Lymph nodes:  No palpable cervical lymphadenopathy        Lab Results:   No visits with results within 1 Day(s) from this visit. Latest known visit with results is:   Orders Only on 11/13/2020   Component Date Value   • SARS-CoV-2  11/13/2020 Not Detected          Radiology Results:   No results found.   Answers for HPI/ROS submitted by the patient on 9/9/2023  Pain location: RLQ

## 2023-09-15 NOTE — PATIENT INSTRUCTIONS
Scheduled date of EGD(as of today):9/19/23  Physician performing EGD:Jacquie  Location of EGD:[facility]  Instructions reviewed with patient by:Burke noonan  Clearances:  none

## 2023-09-18 ENCOUNTER — TELEPHONE (OUTPATIENT)
Dept: GASTROENTEROLOGY | Facility: CLINIC | Age: 58
End: 2023-09-18

## 2023-10-06 ENCOUNTER — HOSPITAL ENCOUNTER (OUTPATIENT)
Dept: GASTROENTEROLOGY | Facility: HOSPITAL | Age: 58
Setting detail: OUTPATIENT SURGERY
Discharge: HOME | End: 2023-10-06
Attending: INTERNAL MEDICINE
Payer: COMMERCIAL

## 2023-10-06 ENCOUNTER — ANESTHESIA (OUTPATIENT)
Dept: GASTROENTEROLOGY | Facility: HOSPITAL | Age: 58
End: 2023-10-06

## 2023-10-06 ENCOUNTER — ANESTHESIA EVENT (OUTPATIENT)
Dept: GASTROENTEROLOGY | Facility: HOSPITAL | Age: 58
End: 2023-10-06

## 2023-10-06 VITALS
RESPIRATION RATE: 10 BRPM | WEIGHT: 222.88 LBS | HEIGHT: 69 IN | DIASTOLIC BLOOD PRESSURE: 86 MMHG | HEART RATE: 56 BPM | BODY MASS INDEX: 33.01 KG/M2 | TEMPERATURE: 96.8 F | OXYGEN SATURATION: 97 % | SYSTOLIC BLOOD PRESSURE: 127 MMHG

## 2023-10-06 DIAGNOSIS — K21.9 GASTROESOPHAGEAL REFLUX DISEASE WITHOUT ESOPHAGITIS: ICD-10-CM

## 2023-10-06 RX ORDER — PROPOFOL 10 MG/ML
INJECTION, EMULSION INTRAVENOUS AS NEEDED
Status: DISCONTINUED | OUTPATIENT
Start: 2023-10-06 | End: 2023-10-06

## 2023-10-06 RX ORDER — SODIUM CHLORIDE, SODIUM LACTATE, POTASSIUM CHLORIDE, CALCIUM CHLORIDE 600; 310; 30; 20 MG/100ML; MG/100ML; MG/100ML; MG/100ML
125 INJECTION, SOLUTION INTRAVENOUS CONTINUOUS
Status: CANCELLED | OUTPATIENT
Start: 2023-10-06

## 2023-10-06 RX ORDER — LIDOCAINE HYDROCHLORIDE 20 MG/ML
INJECTION, SOLUTION EPIDURAL; INFILTRATION; INTRACAUDAL; PERINEURAL AS NEEDED
Status: DISCONTINUED | OUTPATIENT
Start: 2023-10-06 | End: 2023-10-06

## 2023-10-06 RX ORDER — SODIUM CHLORIDE, SODIUM LACTATE, POTASSIUM CHLORIDE, CALCIUM CHLORIDE 600; 310; 30; 20 MG/100ML; MG/100ML; MG/100ML; MG/100ML
INJECTION, SOLUTION INTRAVENOUS CONTINUOUS PRN
Status: DISCONTINUED | OUTPATIENT
Start: 2023-10-06 | End: 2023-10-06

## 2023-10-06 RX ADMIN — LIDOCAINE HYDROCHLORIDE 100 MG: 20 INJECTION, SOLUTION EPIDURAL; INFILTRATION; INTRACAUDAL at 13:07

## 2023-10-06 RX ADMIN — SODIUM CHLORIDE, SODIUM LACTATE, POTASSIUM CHLORIDE, AND CALCIUM CHLORIDE: .6; .31; .03; .02 INJECTION, SOLUTION INTRAVENOUS at 12:51

## 2023-10-06 RX ADMIN — PROPOFOL 30 MG: 10 INJECTION, EMULSION INTRAVENOUS at 13:10

## 2023-10-06 RX ADMIN — PROPOFOL 120 MG: 10 INJECTION, EMULSION INTRAVENOUS at 13:07

## 2023-10-06 RX ADMIN — PROPOFOL 30 MG: 10 INJECTION, EMULSION INTRAVENOUS at 13:08

## 2023-10-06 NOTE — H&P
History and Physical - [facility]  Hannah Means 60 y.o. male MRN: [id number]      HPI: Hannah Means is a 60y.o. year old male who presents for Evaluation of gastroesophageal reflux disease    REVIEW OF SYSTEMS: Per the HPI, and otherwise unremarkable. Historical Information   Past Medical History:   Diagnosis Date   • Hyperlipidemia      Past Surgical History:   Procedure Laterality Date   • COLONOSCOPY      about 3-4 yrs ago   • FOOT SURGERY      right     Social History   Social History     Substance and Sexual Activity   Alcohol Use Not Currently     Social History     Substance and Sexual Activity   Drug Use Never     Social History     Tobacco Use   Smoking Status Never   Smokeless Tobacco Never     Family History   Problem Relation Age of Onset   • Heart disease Mother    • No Known Problems Father        Meds/Allergies     (Not in a hospital admission)      Allergies   Allergen Reactions   • Wound Dressing Adhesive Rash       Objective     Blood pressure 127/76, pulse 57, temperature (!) 96.2 °F (35.7 °C), temperature source Temporal, resp. rate (!) 10, height 5' 9" (1.753 m), weight 101 kg (222 lb 14.2 oz), SpO2 99 %. PHYSICAL EXAM    Gen: NAD  CV: RRR  CHEST: Clear  ABD: soft, NT/ND  EXT: no edema      ASSESSMENT/PLAN:  This is a 60y.o. year old male here for EGD, and he is stable and optimized for his procedure.

## 2023-10-06 NOTE — ANESTHESIA PREPROCEDURE EVALUATION
Procedure:  EGD    Relevant Problems   CARDIO   (+) Hyperlipidemia      GI/HEPATIC   (+) GERD (gastroesophageal reflux disease)        Physical Exam    Airway    Mallampati score: II  TM Distance: >3 FB  Neck ROM: full     Dental   Comment: Denies loose teeth,     Cardiovascular  Cardiovascular exam normal    Pulmonary  Pulmonary exam normal     Other Findings  Portions of exam deferred due to low yield and/or unknown COVID status      Anesthesia Plan  ASA Score- 2     Anesthesia Type- IV sedation with anesthesia with ASA Monitors. Additional Monitors:   Airway Plan:           Plan Factors-Exercise tolerance (METS): >4 METS. Chart reviewed. Existing labs reviewed. Patient summary reviewed. Patient is not a current smoker. Induction- intravenous. Postoperative Plan-     Informed Consent- Anesthetic plan and risks discussed with patient. I personally reviewed this patient with the CRNA. Discussed and agreed on the Anesthesia Plan with the CRNA. Ricki Ambriz

## 2023-10-06 NOTE — ANESTHESIA POSTPROCEDURE EVALUATION
Post-Op Assessment Note    CV Status:  Stable    Pain management: satisfactory to patient     Mental Status:  Sleepy and arousable   Hydration Status:  Euvolemic   PONV Controlled:  Controlled   Airway Patency:  Patent      Post Op Vitals Reviewed: Yes      Staff: CRNA, Anesthesiologist         There were no known notable events for this encounter.     /73 (10/06/23 1317)    Temp (!) 96.8 °F (36 °C) (10/06/23 1317)    Pulse 63 (10/06/23 1317)   Resp 17 (10/06/23 1317)    SpO2 97 % (10/06/23 1317)

## 2023-11-15 ENCOUNTER — TELEPHONE (OUTPATIENT)
Age: 58
End: 2023-11-15

## 2024-02-03 DIAGNOSIS — Z00.6 ENCOUNTER FOR EXAMINATION FOR NORMAL COMPARISON OR CONTROL IN CLINICAL RESEARCH PROGRAM: ICD-10-CM

## 2024-02-19 ENCOUNTER — APPOINTMENT (OUTPATIENT)
Dept: LAB | Facility: HOSPITAL | Age: 59
End: 2024-02-19
Attending: PATHOLOGY

## 2024-02-19 DIAGNOSIS — Z00.6 ENCOUNTER FOR EXAMINATION FOR NORMAL COMPARISON OR CONTROL IN CLINICAL RESEARCH PROGRAM: ICD-10-CM

## 2024-02-19 PROCEDURE — 36415 COLL VENOUS BLD VENIPUNCTURE: CPT

## 2024-03-08 DIAGNOSIS — K21.9 GASTROESOPHAGEAL REFLUX DISEASE WITHOUT ESOPHAGITIS: ICD-10-CM

## 2024-03-08 RX ORDER — PANTOPRAZOLE SODIUM 40 MG/1
40 TABLET, DELAYED RELEASE ORAL DAILY
Qty: 90 TABLET | Refills: 1 | Status: SHIPPED | OUTPATIENT
Start: 2024-03-08

## 2024-03-12 LAB
APOB+LDLR+PCSK9 GENE MUT ANL BLD/T: NOT DETECTED
BRCA1+BRCA2 DEL+DUP + FULL MUT ANL BLD/T: NOT DETECTED
MLH1+MSH2+MSH6+PMS2 GN DEL+DUP+FUL M: NOT DETECTED

## 2024-03-17 ENCOUNTER — APPOINTMENT (EMERGENCY)
Dept: RADIOLOGY | Facility: HOSPITAL | Age: 59
End: 2024-03-17
Payer: COMMERCIAL

## 2024-03-17 ENCOUNTER — HOSPITAL ENCOUNTER (EMERGENCY)
Facility: HOSPITAL | Age: 59
Discharge: HOME | End: 2024-03-17
Attending: EMERGENCY MEDICINE | Admitting: EMERGENCY MEDICINE
Payer: COMMERCIAL

## 2024-03-17 VITALS
OXYGEN SATURATION: 96 % | SYSTOLIC BLOOD PRESSURE: 132 MMHG | RESPIRATION RATE: 17 BRPM | DIASTOLIC BLOOD PRESSURE: 85 MMHG | TEMPERATURE: 98 F | HEART RATE: 73 BPM

## 2024-03-17 DIAGNOSIS — M54.14 THORACIC RADICULOPATHY: Primary | ICD-10-CM

## 2024-03-17 PROCEDURE — 99284 EMERGENCY DEPT VISIT MOD MDM: CPT

## 2024-03-17 PROCEDURE — 71045 X-RAY EXAM CHEST 1 VIEW: CPT

## 2024-03-17 PROCEDURE — 93005 ELECTROCARDIOGRAM TRACING: CPT

## 2024-03-17 PROCEDURE — 99284 EMERGENCY DEPT VISIT MOD MDM: CPT | Performed by: NURSE PRACTITIONER

## 2024-03-17 RX ORDER — NAPROXEN SODIUM 220 MG
220 TABLET ORAL 2 TIMES DAILY WITH MEALS
Qty: 14 TABLET | Refills: 0 | Status: SHIPPED | OUTPATIENT
Start: 2024-03-17 | End: 2024-03-24

## 2024-03-17 RX ORDER — CYCLOBENZAPRINE HCL 10 MG
10 TABLET ORAL 3 TIMES DAILY PRN
Qty: 21 TABLET | Refills: 0 | Status: SHIPPED | OUTPATIENT
Start: 2024-03-17 | End: 2024-03-24

## 2024-03-17 NOTE — ED PROVIDER NOTES
"History  Chief Complaint   Patient presents with    Pain With Breathing     Pt presents ambulatory c/o \"R sided CP with breathing x 1month. Denies recent long distance travel.\"     59-year-old male patient presenting here today with a chief complaint of pain on deep inspiration.  This been occurring for the last month or so.  When he takes shallow respirations it does not seem to bother him but when he takes deep respirations he could feel it on the right parathoracic area to the right anterior chest.          Prior to Admission Medications   Prescriptions Last Dose Informant Patient Reported? Taking?   ALPRAZolam (XANAX) 0.5 mg tablet  Self Yes Yes   pantoprazole (PROTONIX) 40 mg tablet   No Yes   Sig: TAKE 1 TABLET(40 MG) BY MOUTH DAILY   simvastatin (ZOCOR) 20 mg tablet  Self Yes No   Sig: Take 20 mg by mouth daily   tacrolimus (PROTOPIC) 0.1 % ointment  Self No Yes   Sig: Apply topically 2 (two) times a day To rash until completely resolved      Facility-Administered Medications: None       Past Medical History:   Diagnosis Date    Hyperlipidemia        Past Surgical History:   Procedure Laterality Date    COLONOSCOPY      about 3-4 yrs ago    FOOT SURGERY      right       Family History   Problem Relation Age of Onset    Heart disease Mother     No Known Problems Father      I have reviewed and agree with the history as documented.    E-Cigarette/Vaping    E-Cigarette Use Never User      E-Cigarette/Vaping Substances    Nicotine No     THC No     CBD No     Flavoring No     Other No     Unknown No      Social History     Tobacco Use    Smoking status: Never    Smokeless tobacco: Never   Vaping Use    Vaping status: Never Used   Substance Use Topics    Alcohol use: Not Currently    Drug use: Never       Review of Systems   Constitutional:  Negative for chills and fever.   HENT:  Negative for ear pain and sore throat.    Eyes:  Negative for pain and visual disturbance.   Respiratory:  Negative for cough and " shortness of breath.    Cardiovascular:  Negative for chest pain and palpitations.   Gastrointestinal:  Negative for abdominal pain and vomiting.   Genitourinary:  Negative for dysuria and hematuria.   Musculoskeletal:  Negative for arthralgias and back pain.   Skin:  Negative for color change and rash.   Neurological:  Negative for seizures and syncope.   All other systems reviewed and are negative.      Physical Exam  Physical Exam  Vitals and nursing note reviewed.   Constitutional:       General: He is not in acute distress.     Appearance: He is well-developed.   HENT:      Head: Normocephalic and atraumatic.   Eyes:      General:         Right eye: No discharge.         Left eye: No discharge.      Conjunctiva/sclera: Conjunctivae normal.   Cardiovascular:      Rate and Rhythm: Normal rate.   Pulmonary:      Effort: Pulmonary effort is normal. No respiratory distress.   Chest:       Abdominal:      General: There is no distension.      Tenderness: There is no guarding.   Musculoskeletal:         General: No deformity.      Cervical back: Normal range of motion and neck supple.      Thoracic back: Tenderness present.        Back:    Skin:     General: Skin is warm and dry.   Neurological:      Mental Status: He is alert and oriented to person, place, and time.      Coordination: Coordination normal.         Vital Signs  ED Triage Vitals   Temperature Pulse Respirations Blood Pressure SpO2   03/17/24 0935 03/17/24 0935 03/17/24 0935 03/17/24 0935 03/17/24 0935   98 °F (36.7 °C) 69 18 139/90 97 %      Temp Source Heart Rate Source Patient Position - Orthostatic VS BP Location FiO2 (%)   03/17/24 0935 03/17/24 0935 -- 03/17/24 1000 --   Temporal Monitor  Right arm       Pain Score       --                  Vitals:    03/17/24 0935 03/17/24 1000   BP: 139/90 132/85   Pulse: 69 73         Visual Acuity      ED Medications  Medications - No data to display    Diagnostic Studies  Results Reviewed       None                    XR chest 1 view portable   ED Interpretation by DRAKE Dawn (03/17 1033)   No acute cardiopulmonary findings                 Procedures  Procedures         ED Course                               SBIRT 20yo+      Flowsheet Row Most Recent Value   Initial Alcohol Screen: US AUDIT-C     1. How often do you have a drink containing alcohol? 0 Filed at: 03/17/2024 0935   2. How many drinks containing alcohol do you have on a typical day you are drinking?  0 Filed at: 03/17/2024 0935   3a. Male UNDER 65: How often do you have five or more drinks on one occasion? 0 Filed at: 03/17/2024 0935   Audit-C Score 0 Filed at: 03/17/2024 0935   CHANG: How many times in the past year have you...    Used an illegal drug or used a prescription medication for non-medical reasons? Never Filed at: 03/17/2024 0935                      Medical Decision Making  Patient's pain is reproducible with peak deep inspiration.  This has been occurring for the last month unlikely cardiac in nature with the reproducibility and posterior anterior discomfort generated by deep inspiration.  Also exacerbated by certain position changes.  Recommend following up with physical therapy we will try anti-inflammatories and muscle relaxers.  Films were interpreted by myself and were unremarkable no pneumonia no pneumothorax.    Amount and/or Complexity of Data Reviewed  Radiology: ordered and independent interpretation performed.    Risk  OTC drugs.  Prescription drug management.             Disposition  Final diagnoses:   Thoracic radiculopathy     Time reflects when diagnosis was documented in both MDM as applicable and the Disposition within this note       Time User Action Codes Description Comment    3/17/2024 10:16 AM Pako Justice Add [M54.14] Thoracic radiculopathy           ED Disposition       ED Disposition   Discharge    Condition   Stable    Date/Time   Sun Mar 17, 2024 1016    Comment   Monty Bernal discharge to home/self care.                    Follow-up Information    None         Discharge Medication List as of 3/17/2024 10:33 AM        START taking these medications    Details   cyclobenzaprine (FLEXERIL) 10 mg tablet Take 1 tablet (10 mg total) by mouth 3 (three) times a day as needed for muscle spasms for up to 7 days, Starting Sun 3/17/2024, Until Sun 3/24/2024 at 2359, Normal      naproxen sodium (ALEVE) 220 MG tablet Take 1 tablet (220 mg total) by mouth 2 (two) times a day with meals for 7 days, Starting Sun 3/17/2024, Until Sun 3/24/2024, Normal           CONTINUE these medications which have NOT CHANGED    Details   ALPRAZolam (XANAX) 0.5 mg tablet Starting Thu 5/23/2019, Historical Med      pantoprazole (PROTONIX) 40 mg tablet TAKE 1 TABLET(40 MG) BY MOUTH DAILY, Starting Fri 3/8/2024, Normal      tacrolimus (PROTOPIC) 0.1 % ointment Apply topically 2 (two) times a day To rash until completely resolved, Starting Mon 4/25/2022, Normal      simvastatin (ZOCOR) 20 mg tablet Take 20 mg by mouth daily, Starting Thu 6/27/2019, Historical Med                 PDMP Review       None            ED Provider  Electronically Signed by             DRAKE Dawn  03/17/24 0826

## 2024-03-18 LAB
ATRIAL RATE: 70 BPM
P AXIS: 66 DEGREES
PR INTERVAL: 142 MS
QRS AXIS: 82 DEGREES
QRSD INTERVAL: 92 MS
QT INTERVAL: 380 MS
QTC INTERVAL: 410 MS
T WAVE AXIS: 58 DEGREES
VENTRICULAR RATE: 70 BPM

## 2024-03-18 PROCEDURE — 93010 ELECTROCARDIOGRAM REPORT: CPT | Performed by: INTERNAL MEDICINE

## 2024-11-16 DIAGNOSIS — K21.9 GASTROESOPHAGEAL REFLUX DISEASE WITHOUT ESOPHAGITIS: ICD-10-CM

## 2024-11-18 RX ORDER — PANTOPRAZOLE SODIUM 40 MG/1
40 TABLET, DELAYED RELEASE ORAL DAILY
Qty: 30 TABLET | Refills: 0 | Status: SHIPPED | OUTPATIENT
Start: 2024-11-18 | End: 2024-11-21 | Stop reason: SDUPTHER

## 2024-11-21 ENCOUNTER — OFFICE VISIT (OUTPATIENT)
Dept: GASTROENTEROLOGY | Facility: CLINIC | Age: 59
End: 2024-11-21
Payer: COMMERCIAL

## 2024-11-21 VITALS
DIASTOLIC BLOOD PRESSURE: 82 MMHG | TEMPERATURE: 97.4 F | HEART RATE: 72 BPM | BODY MASS INDEX: 29.33 KG/M2 | WEIGHT: 198 LBS | HEIGHT: 69 IN | SYSTOLIC BLOOD PRESSURE: 102 MMHG

## 2024-11-21 DIAGNOSIS — K21.9 GASTROESOPHAGEAL REFLUX DISEASE WITHOUT ESOPHAGITIS: Primary | ICD-10-CM

## 2024-11-21 DIAGNOSIS — Z12.11 SCREENING FOR COLON CANCER: ICD-10-CM

## 2024-11-21 PROCEDURE — 99213 OFFICE O/P EST LOW 20 MIN: CPT | Performed by: PHYSICIAN ASSISTANT

## 2024-11-21 RX ORDER — TIRZEPATIDE 10 MG/.5ML
INJECTION, SOLUTION SUBCUTANEOUS
COMMUNITY

## 2024-11-21 RX ORDER — PANTOPRAZOLE SODIUM 40 MG/1
40 TABLET, DELAYED RELEASE ORAL DAILY
Qty: 30 TABLET | Refills: 11 | Status: SHIPPED | OUTPATIENT
Start: 2024-11-21

## 2024-11-21 NOTE — ASSESSMENT & PLAN NOTE
Worsening since being on Zepbound which he started last spring  He has lost more than 25lbs which I congratulated him on     Advised he could continue Zepbound and we will have to increase medication to treat the side effects  Alternatively he is think of stopping - advised after he stops if symptoms do not improve he should call    EGD 10/2023 was normal

## 2024-11-21 NOTE — PROGRESS NOTES
"Name: Monty Bernal      : 1965      MRN: [id number]  Encounter Provider: Kayla Lopez PA-C  Encounter Date: 2024   Encounter department: [facility] [place]  :  Assessment & Plan  Gastroesophageal reflux disease without esophagitis  Worsening since being on Zepbound which he started last spring  He has lost more than 25lbs which I congratulated him on     Advised he could continue Zepbound and we will have to increase medication to treat the side effects  Alternatively he is think of stopping - advised after he stops if symptoms do not improve he should call    EGD 10/2023 was normal      Screening for colon cancer  Colonoscopy is due in   Last exam in  was normal except for diverticulosis           History of Present Illness     HPI  Monty Bernal is a 59 y.o. male with a history of GERD and hyperlipidemia who presents for routine follow-up.  Patient remains on pantoprazole 40 mg daily.  He was started on Zepbound in the spring of last year.  He has successfully lost over 25 pounds however he has had significant worsening of his gastrointestinal symptoms.  He reports worsening heartburn and regurgitation.  He recently did a 14-day omeprazole course with his pantoprazole due to symptoms.  This did help temporarily.  He denies any vomiting.  He has no dysphagia.  He has no hematemesis or melena.  He also is struggling with his bowel movements.  He reports that he is alternating between feeling constipated and having diarrhea.  This also started when he started Zepbound.  He had a normal EGD in 2023.  His last colonoscopy was in  and noted diverticulosis but was otherwise a normal exam.      Review of Systems       Objective   /82 (BP Location: Right arm, Patient Position: Sitting, Cuff Size: Standard)   Pulse 72   Temp (!) 97.4 °F (36.3 °C) (Temporal)   Ht 5' 9\" (1.753 m)   Wt 89.8 kg (198 lb)   BMI 29.24 kg/m²      Physical Exam      "